# Patient Record
Sex: FEMALE | Race: WHITE | NOT HISPANIC OR LATINO | Employment: STUDENT | URBAN - METROPOLITAN AREA
[De-identification: names, ages, dates, MRNs, and addresses within clinical notes are randomized per-mention and may not be internally consistent; named-entity substitution may affect disease eponyms.]

---

## 2021-12-30 ENCOUNTER — TELEPHONE (OUTPATIENT)
Dept: URGENT CARE | Facility: CLINIC | Age: 21
End: 2021-12-30

## 2021-12-30 ENCOUNTER — TELEMEDICINE (OUTPATIENT)
Dept: FAMILY MEDICINE CLINIC | Facility: TELEHEALTH | Age: 21
End: 2021-12-30

## 2021-12-30 DIAGNOSIS — J02.9 EXUDATIVE PHARYNGITIS: Primary | ICD-10-CM

## 2021-12-30 PROCEDURE — 99213 OFFICE O/P EST LOW 20 MIN: CPT | Performed by: NURSE PRACTITIONER

## 2021-12-30 RX ORDER — PREDNISONE 10 MG/1
TABLET ORAL DAILY
Qty: 21 EACH | Refills: 0 | Status: SHIPPED | OUTPATIENT
Start: 2021-12-30 | End: 2022-01-05

## 2021-12-30 RX ORDER — NORETHINDRONE ACETATE AND ETHINYL ESTRADIOL, ETHINYL ESTRADIOL AND FERROUS FUMARATE 1MG-10(24)
1 KIT ORAL DAILY
COMMUNITY
Start: 2021-10-14

## 2021-12-30 RX ORDER — AMOXICILLIN AND CLAVULANATE POTASSIUM 875; 125 MG/1; MG/1
1 TABLET, FILM COATED ORAL 2 TIMES DAILY
Qty: 20 TABLET | Refills: 0 | Status: SHIPPED | OUTPATIENT
Start: 2021-12-30 | End: 2022-01-09

## 2021-12-30 NOTE — PROGRESS NOTES
Subjective   Chief Complaint   Patient presents with   • Sore Throat   • Earache   • Nasal Congestion       Nidhi Perez is a 21 y.o. female.     Pt reports bilateral earache, congestion, sore throat with white exudate x 4 days. She was seen 3 days ago for these symptoms, tested negative for strep throat but was prescribed Amoxil and Prednisone for 2 days. She states the prednisone helped some, but once she stopped it all her symptoms feel the same and sore throat is not improving. She states she has been Amoxil many times in the past for ear infections.     Sore Throat   This is a new problem. Episode onset: 4 days. The problem has been unchanged. There has been no fever. Associated symptoms include congestion, ear pain and swollen glands. Pertinent negatives include no abdominal pain, coughing, diarrhea, drooling, ear discharge, headaches, hoarse voice, plugged ear sensation, neck pain, shortness of breath, stridor, trouble swallowing or vomiting. Treatments tried: amoxil, prednisone.        No Known Allergies    History reviewed. No pertinent past medical history.    Past Surgical History:   Procedure Laterality Date   • KNEE ACL RECONSTRUCTION Left        Social History     Socioeconomic History   • Marital status: Single   Tobacco Use   • Smoking status: Never Smoker   • Smokeless tobacco: Never Used   Substance and Sexual Activity   • Alcohol use: Never   • Drug use: Defer   • Sexual activity: Defer       History reviewed. No pertinent family history.      Current Outpatient Medications:   •  amoxicillin-clavulanate (Augmentin) 875-125 MG per tablet, Take 1 tablet by mouth 2 (Two) Times a Day for 10 days., Disp: 20 tablet, Rfl: 0  •  Lo Loestrin Fe 1 MG-10 MCG / 10 MCG tablet, Take 1 tablet by mouth Daily., Disp: , Rfl:   •  predniSONE (DELTASONE) 10 MG (21) dose pack, Take  by mouth Daily for 6 days., Disp: 21 each, Rfl: 0      Review of Systems   Constitutional: Negative for chills, diaphoresis,  fatigue and fever.   HENT: Positive for congestion, ear pain, sore throat and swollen glands. Negative for drooling, ear discharge, hoarse voice and trouble swallowing.    Respiratory: Negative for cough, chest tightness, shortness of breath and stridor.    Gastrointestinal: Negative for abdominal pain, diarrhea and vomiting.   Musculoskeletal: Negative for myalgias and neck pain.        There were no vitals filed for this visit.    Objective   Physical Exam  Constitutional:       General: She is not in acute distress.     Appearance: Normal appearance. She is not ill-appearing, toxic-appearing or diaphoretic.   HENT:      Head: Normocephalic.      Nose: Congestion (per pt) present.      Mouth/Throat:      Lips: Pink.      Mouth: Mucous membranes are moist.      Pharynx: Posterior oropharyngeal erythema present.      Tonsils: Tonsillar exudate present.      Comments: Per pt, she states her throat feels tight and swollen, no difficulty swallowing or breathing.   Pulmonary:      Effort: Pulmonary effort is normal.   Lymphadenopathy:      Head:      Right side of head: Tonsillar adenopathy present.      Left side of head: Tonsillar adenopathy present.      Comments: Per pt     Neurological:      Mental Status: She is alert and oriented to person, place, and time.   Psychiatric:         Mood and Affect: Mood normal.         Behavior: Behavior normal.          Procedures     Assessment/Plan   Diagnoses and all orders for this visit:    1. Exudative pharyngitis (Primary)  -     amoxicillin-clavulanate (Augmentin) 875-125 MG per tablet; Take 1 tablet by mouth 2 (Two) Times a Day for 10 days.  Dispense: 20 tablet; Refill: 0  -     predniSONE (DELTASONE) 10 MG (21) dose pack; Take  by mouth Daily for 6 days.  Dispense: 21 each; Refill: 0    Stop Amoxil and begin Augmentin.   Warm salt water gargles throughout the day for comfort.  Take Tylenol for pain and/or fever, stay hydrated and rest.   If symptoms worsen or do not  improve follow up with your PCP or visit your nearest Urgent Care Center or ER.      Results for orders placed or performed during the hospital encounter of 12/27/21   POCT Rapid Strep A    Specimen: Swab   Result Value Ref Range    Rapid Strep A Screen Negative Negative, VALID, INVALID, Not Performed    Internal Control Passed Passed    Lot Number [id number]     Expiration Date 4/30/22        PLAN: Discussed dosing, side effects, recommended other symptomatic care.  Patient should follow up with primary care provider if symptoms worsen, fail to resolve or other symptoms need attention. Patient/family agree to the above.         RAVEN Diaz     This visit was performed via Telehealth.  This patient has been instructed to follow-up with their primary care provider if their symptoms worsen or the treatment provided does not resolve their illness.

## 2021-12-30 NOTE — PATIENT INSTRUCTIONS
Stop Amoxil and begin Augmentin.   Warm salt water gargles throughout the day for comfort.  Take Tylenol for pain and/or fever, stay hydrated and rest.   If symptoms worsen or do not improve follow up with your PCP or visit your nearest Urgent Care Center or ER.      Strep Throat, Adult  Strep throat is an infection of the throat. It is caused by germs (bacteria). Strep throat is common during the cold months of the year. It mostly affects children who are 5-15 years old. However, people of all ages can get it at any time of the year.  When strep throat affects the tonsils, it is called tonsillitis. When it affects the back of the throat, it is called pharyngitis. This infection spreads from person to person through coughing, sneezing, or having close contact.  What are the causes?  This condition is caused by the Streptococcus pyogenes germ.  What increases the risk?  You are more likely to develop this condition if:  · You care for young children. Children are more likely to get strep throat and may spread it to others.  · You go to crowded places. Germs can spread easily in such places.  · You kiss or touch someone who has strep throat.  What are the signs or symptoms?  Symptoms of this condition include:  · Fever or chills.  · Redness, swelling, or pain in the tonsils or throat.  · Pain or trouble when swallowing.  · White or yellow spots on the tonsils or throat.  · Tender glands in the neck and under the jaw.  · Bad breath.  · Red rash all over the body. This is rare.  How is this treated?  This condition may be treated with:  · Medicines that kill germs (antibiotics).  · Medicines that treat pain or fever. These include:  ? Ibuprofen or acetaminophen.  ? Aspirin, only for patients who are over the age of 18.  ? Throat lozenges.  ? Throat sprays.  Follow these instructions at home:  Medicines    · Take over-the-counter and prescription medicines only as told by your doctor.  · Take your antibiotic medicine as  told by your doctor. Do not stop taking the antibiotic even if you start to feel better.    Eating and drinking    · If you have trouble swallowing, eat soft foods until your throat feels better.  · Drink enough fluid to keep your pee (urine) pale yellow.  · To help with pain, you may have:  ? Warm fluids, such as soup and tea.  ? Cold fluids, such as frozen desserts or popsicles.    General instructions  · Rinse your mouth (gargle) with a salt-water mixture 3-4 times a day or as needed. To make a salt-water mixture, dissolve ½-1 tsp (3-6 g) of salt in 1 cup (237 mL) of warm water.  · Rest as much as you can.  · Stay home from work or school until you have been taking antibiotics for 24 hours.  · Avoid smoking or being around people who smoke.  · Keep all follow-up visits as told by your doctor. This is important.  How is this prevented?    · Do not share food, drinking cups, or personal items. They can cause the germs to spread.  · Wash your hands well with soap and water. Make sure that all people in your house wash their hands well.  · Have family members tested if they have a fever or a sore throat. They may need an antibiotic if they have strep throat.    Contact a doctor if:  · You have swelling in your neck that keeps getting bigger.  · You get a rash, cough, or earache.  · You cough up a thick fluid that is green, yellow-brown, or bloody.  · You have pain that does not get better with medicine.  · Your symptoms get worse instead of getting better.  · You have a fever.  Get help right away if:  · You vomit.  · You have a very bad headache.  · Your neck hurts or feels stiff.  · You have chest pain or are short of breath.  · You have drooling, very bad throat pain, or changes in your voice.  · Your neck is swollen, or the skin gets red and tender.  · Your mouth is dry, or you are peeing less than normal.  · You keep feeling more tired or have trouble waking up.  · Your joints are red or  painful.  Summary  · Strep throat is an infection of the throat. It is caused by germs (bacteria).  · This infection can spread from person to person through coughing, sneezing, or having close contact.  · Take your medicines, including antibiotics, as told by your doctor. Do not stop taking the antibiotic even if you start to feel better.  · To prevent the spread of germs, wash your hands well with soap and water. Have others do the same. Do not share food, drinking cups, or personal items.  · Get help right away if you have a bad headache, chest pain, shortness of breath, a stiff or painful neck, or you vomit.  This information is not intended to replace advice given to you by your health care provider. Make sure you discuss any questions you have with your health care provider.  Document Revised: 03/06/2020 Document Reviewed: 03/06/2020  [employer] Patient Education © 2021 [employer]    Tonsillitis    Tonsillitis is an infection of the throat that causes the tonsils to become red, tender, and swollen. Tonsils are tissues in the back of your throat. Each tonsil has crevices (crypts). Tonsils normally work to protect the body from infection.  What are the causes?  Sudden (acute) tonsillitis may be caused by a virus or bacteria, including streptococcal bacteria. Long-lasting (chronic) tonsillitis occurs when the crypts of the tonsils become filled with pieces of food and bacteria, which makes it easy for the tonsils to become repeatedly infected.  Tonsillitis can be spread from person to person (is contagious). It may be spread by inhaling droplets that are released with coughing or sneezing. You may also come into contact with viruses or bacteria on surfaces, such as cups or utensils.  What are the signs or symptoms?  Symptoms of this condition include:  · A sore throat. This may include trouble swallowing.  · White patches on the tonsils.  · Swollen tonsils.  · Fever.  · Headache.  · Tiredness.  · Loss of  appetite.  · Snoring during sleep when you did not snore before.  · Small, foul-smelling, yellowish-white pieces of material (tonsilloliths) that you occasionally cough up or spit out. These can cause you to have bad breath.  How is this diagnosed?  This condition is diagnosed with a physical exam. Diagnosis can be confirmed with the results of lab tests, including a throat culture.  How is this treated?  Treatment for this condition depends on the cause, but usually focuses on treating the symptoms associated with it. Treatment may include:  · Medicines to relieve pain and manage fever.  · Steroid medicines to reduce swelling.  · Antibiotic medicines if the condition is caused by bacteria.  If attacks of tonsillitis are severe and frequent, your health care provider may recommend surgery to remove the tonsils (tonsillectomy).  Follow these instructions at home:  Medicines  · Take over-the-counter and prescription medicines only as told by your health care provider.  · If you were prescribed an antibiotic medicine, take it as told by your health care provider. Do not stop taking the antibiotic even if you start to feel better.  Eating and drinking  · Drink enough fluid to keep your urine clear or pale yellow.  · While your throat is sore, eat soft or liquid foods, such as sherbet, soups, or instant breakfast drinks.  · Drink warm liquids.  · Eat frozen ice pops.  General instructions  · Rest as much as possible and get plenty of sleep.  · Gargle with a salt-water mixture 3-4 times a day or as needed. To make a salt-water mixture, completely dissolve ½-1 tsp of salt in 1 cup of warm water.  · Wash your hands regularly with soap and water. If soap and water are not available, use hand .  · Do not share cups, bottles, or other utensils until your symptoms have gone away.  · Do not smoke. This can help your symptoms and prevent the infection from coming back. If you need help quitting, ask your health care  provider.  · Keep all follow-up visits as told by your health care provider. This is important.  Contact a health care provider if:  · You notice large, tender lumps in your neck that were not there before.  · You have a fever that does not go away after 2-3 days.  · You develop a rash.  · You cough up a green, yellow-brown, or bloody substance.  · You cannot swallow liquids or food for 24 hours.  · Only one of your tonsils is swollen.  Get help right away if:  · You develop any new symptoms, such as vomiting, severe headache, stiff neck, chest pain, trouble breathing, or trouble swallowing.  · You have severe throat pain along with drooling or voice changes.  · You have severe pain that is not controlled with medicines.  · You cannot fully open your mouth.  · You develop redness, swelling, or severe pain anywhere in your neck.  Summary  · Tonsillitis is an infection of the throat that causes the tonsils to become red, tender, and swollen.  · Tonsillitis may be caused by a virus or bacteria.  · Rest as much as possible. Get plenty of sleep.  This information is not intended to replace advice given to you by your health care provider. Make sure you discuss any questions you have with your health care provider.  Document Revised: 11/30/2018 Document Reviewed: 01/23/2018  [employer] Patient Education © 2021 [employer]

## 2023-08-03 ENCOUNTER — E-VISIT (OUTPATIENT)
Dept: FAMILY MEDICINE CLINIC | Facility: TELEHEALTH | Age: 23
End: 2023-08-03

## 2023-08-03 PROCEDURE — BRIGHTMDVISIT: Performed by: NURSE PRACTITIONER

## 2024-07-24 ENCOUNTER — OFFICE VISIT (OUTPATIENT)
Age: 24
End: 2024-07-24
Payer: COMMERCIAL

## 2024-07-24 VITALS
OXYGEN SATURATION: 100 % | BODY MASS INDEX: 20.33 KG/M2 | HEART RATE: 53 BPM | HEIGHT: 70 IN | DIASTOLIC BLOOD PRESSURE: 80 MMHG | SYSTOLIC BLOOD PRESSURE: 118 MMHG | WEIGHT: 142 LBS

## 2024-07-24 DIAGNOSIS — Z53.21 PATIENT LEFT WITHOUT BEING SEEN: Primary | ICD-10-CM

## 2024-07-24 DIAGNOSIS — Z00.00 HEALTHCARE MAINTENANCE: Primary | ICD-10-CM

## 2024-07-24 PROCEDURE — 99385 PREV VISIT NEW AGE 18-39: CPT | Performed by: STUDENT IN AN ORGANIZED HEALTH CARE EDUCATION/TRAINING PROGRAM

## 2024-07-24 RX ORDER — LEVONORGESTREL 52 MG/1
1 INTRAUTERINE DEVICE INTRAUTERINE ONCE
COMMUNITY

## 2024-07-24 NOTE — PROGRESS NOTES
Chief Complaint  Left Without Being Seen    Subjective          Nidhi Perez presents to [facility] for   History of Present Illness    Objective   Vital Signs:   Vitals:     There is no height or weight on file to calculate BMI.              Physical Exam   Result Review :                Immunization History   Administered Date(s) Administered    COVID-19 (PFIZER) Purple Cap Monovalent 05/18/2021, 06/09/2021, 01/02/2022    Fluzone (or Fluarix & Flulaval for VFC) >6mos 10/09/2021    Influenza Injectable Mdck Pf Quad 01/02/2023       Health Maintenance   Topic Date Due    HPV VACCINES (1 - 3-dose series) Never done    TDAP/TD VACCINES (1 - Tdap) Never done    HEPATITIS C SCREENING  Never done    ANNUAL PHYSICAL  Never done    PAP SMEAR  Never done    COVID-19 Vaccine (4 - 2023-24 season) 10/13/2024 (Originally 9/1/2023)    INFLUENZA VACCINE  08/01/2024    Pneumococcal Vaccine 0-64  Aged Out            Assessment and Plan    Diagnoses and all orders for this visit:    1. Patient left without being seen (Primary)        Counseling/anticipatory guidance: Nutrition, physical activity, healthy weight, dental health, mental health, eye exam, immunizations, screenings      Follow Up   No follow-ups on file.  Patient was given instructions and counseling regarding her condition or for health maintenance advice. Please see specific information pulled into the AVS if appropriate.

## 2024-07-24 NOTE — LETTER
[address] [address]  [phone]       [facility]  IMMUNIZATION CERTIFICATE    (Required for each child enrolled in day care center, certified family  home, other licensed facility which cares for children,  programs, and public and private primary and secondary schools.)    Name of Child:  Nidhi Perez  YOB: 2000   Name of Parent:  ______________________________  Address:  [address] [place] [id number]     VACCINE/DOSE DATE DATE DATE DATE DATE   Hepatitis B 2000 2001 2001     Alt. Adult Hepatitis B¹        DTap/DTP/DT² 2001 2001 2001 7/12/2002 1/27/2005   Hib³ 2001 2001 2001 4/19/2002    Pneumococcal  2001 2001 2001 1/14/2002    Polio 2001 2001 7/12/2002 1/27/2005    Influenza 10/9/2021       MMR 4/19/2002 1/27/2005      Varicella 1/14/2002 8/10/2011      Hepatitis A 6/27/2017 3/7/2018      Meningococcal 1/30/2012 6/27/2017      Td        Tdap 1/30/2012 7/30/2021      Rotavirus        HPV 6/24/2016 10/12/2016 4/7/2017     Men B 6/27/2017 3/7/2018      Pneumococcal (PPSV23)          ¹ Alternative two dose series of approved adult hepatitis B vaccine for adolescents 11 through 15 years of age. ² DTaP, DTP, or DT. ³ Hib not required at 5 years of age or more.    Had Chickenpox or Zoster disease: No     This child is current for immunizations until   /  /  , (14 days after the next shot is due) after which this certificate is no longer valid, and a new certificate must be obtained.   This child is not up-to-date at this time.  This certificate is valid until   /  /  ,l  (14 days after the next shot is due) after which this certificate is no longer valid, and a new certificate must be obtained.    Reason child is not up-to-date:   Provisional Status - Child is behind on required immunizations.   Medical Exemption - The following immunizations are not medically  indicated:  ___________________                                      _______________________________________________________________________________       If Medical Exemption, can these vaccines be administered at a later date?  No:  _  Yes: _  Date: __/__/__    Congregational Objection  I CERTIFY THAT THE ABOVE NAMED CHILD HAS RECEIVED IMMUNIZATIONS AS STIPULATED ABOVE.     __________________________________________________________     Date: 7/24/2024   (Signature of physician, APRN, PA, pharmacist, LHD , RN or LPN designee)      This Certificate should be presented to the school or facility in which the child intends to enroll and should be retained by the school or facility and filed with the child's health record.

## 2024-07-24 NOTE — PROGRESS NOTES
Chief Complaint  Left Without Being Seen    Subjective     {CC  Problem List  Visit Diagnosis   Encounters  Notes  Medications  Labs  Result Review Imaging  Media :23}     Nidhi Perez presents to [facility] for   History of Present Illness  Objective   Vital Signs:   Vitals:     There is no height or weight on file to calculate BMI.  ?  BMI is within normal parameters. No other follow-up for BMI required.    Physical Exam   Result Review :{ Labs  Result Review  Imaging  Med Tab  Media :23}   {The following data was reviewed by (Optional):20781}  {Ambulatory Labs (Optional):28578}  {Data reviewed (Optional):02248:::1}         Immunization History   Administered Date(s) Administered   • COVID-19 (PFIZER) Purple Cap Monovalent 05/18/2021, 06/09/2021, 01/02/2022   • Fluzone (or Fluarix & Flulaval for VFC) >6mos 10/09/2021   • Influenza Injectable Mdck Pf Quad 01/02/2023     Health Maintenance   Topic Date Due   • HPV VACCINES (1 - 3-dose series) Never done   • TDAP/TD VACCINES (1 - Tdap) Never done   • HEPATITIS C SCREENING  Never done   • ANNUAL PHYSICAL  Never done   • PAP SMEAR  Never done   • COVID-19 Vaccine (4 - 2023-24 season) 10/13/2024 (Originally 9/1/2023)   • INFLUENZA VACCINE  08/01/2024   • Pneumococcal Vaccine 0-64  Aged Out        Assessment and Plan {CC Problem List  Visit Diagnosis  ROS  Review (Popup)  Health Maintenance  Quality  BestPractice  Medications  SmartSets  SnapShot Encounters  Media :23}   Diagnoses and all orders for this visit:    1. Patient left without being seen (Primary)        Counseling/anticipatory guidance: Nutrition, physical activity, healthy weight, dental health, mental health, eye exam, immunizations, screenings    {Time Spent (Optional):59520}  Follow Up {Instructions Charge Capture  Follow-up Communications :23}  No follow-ups on file.  Patient was given instructions and counseling regarding her condition  or for health maintenance advice. Please see specific information pulled into the AVS if appropriate.        The patient left the office {Time of Visit:99203} care was provided and did not complete the visit {LWBS Reason:00295}.

## 2024-07-25 NOTE — PROGRESS NOTES
Office Note     Name: Nidhi Perez    : 2000     MRN: [id number]     Chief Complaint  New Patient    Subjective     History of Present Illness:  Nidhi Perez is a 24 y.o. female who presents today for initial visit to establish care and for health maintenance exam with sports physical.  She is playing basketball in college and has no new symptomatic concerns. History of acl surgery.     Past Medical History: History reviewed. No pertinent past medical history.    Past Surgical History:   Past Surgical History:   Procedure Laterality Date    KNEE ACL RECONSTRUCTION Left        Immunizations:   Immunization History   Administered Date(s) Administered    COVID-19 (PFIZER) Purple Cap Monovalent 2021, 2021, 2022    DTaP 2001, 2001, 2001, 2002, 2005    Fluzone (or Fluarix & Flulaval for VFC) >6mos 10/09/2021    Hepatitis A 2017, 2018    Hepatitis B Adult/Adolescent IM 2001, 2001, 2001    HiB 2001, 2001, 2001, 2002    Hpv9 2016, 10/12/2016, 2017    IPV 2001, 2001, 2002, 2005    Influenza Injectable Mdck Pf Quad 2023    MMR 2002, 2005    Meningococcal B,(Bexsero) 2017, 2018    Meningococcal Conjugate 2012, 2017    Pneumococcal, Unspecified 2001, 2001, 2001, 2002    Tdap 2012, 2021    Varicella 2002, 08/10/2011        Medications:     Current Outpatient Medications:     Levonorgestrel (Mirena, 52 MG,) 20 MCG/DAY intrauterine device IUD, To be inserted one time by prescriber. Route intrauterine., Disp: , Rfl:     Allergies:   No Known Allergies    Family History: History reviewed. No pertinent family history.    Social History:   Social History     Socioeconomic History    Marital status: Single   Tobacco Use    Smoking status: Never    Smokeless tobacco: Never   Vaping Use     "Vaping status: Never Used   Substance and Sexual Activity    Alcohol use: Never    Drug use: Never    Sexual activity: Not Currently     Partners: Male     Birth control/protection: I.U.D.         Objective     Vital Signs  /80 (BP Location: Right arm, Patient Position: Sitting, Cuff Size: Adult)   Pulse 53   Ht 177.5 cm (69.9\")   Wt 64.4 kg (142 lb)   SpO2 100%   BMI 20.43 kg/m²   Estimated body mass index is 20.43 kg/m² as calculated from the following:    Height as of this encounter: 177.5 cm (69.9\").    Weight as of this encounter: 64.4 kg (142 lb).    BMI is within normal parameters. No other follow-up for BMI required.      Physical Exam  Constitutional:       General: She is not in acute distress.     Appearance: She is not toxic-appearing.   HENT:      Mouth/Throat:      Mouth: Mucous membranes are moist.      Pharynx: No oropharyngeal exudate or posterior oropharyngeal erythema.   Eyes:      Extraocular Movements: Extraocular movements intact.      Pupils: Pupils are equal, round, and reactive to light.   Cardiovascular:      Rate and Rhythm: Normal rate and regular rhythm.      Heart sounds: No murmur heard.     No friction rub. No gallop.   Pulmonary:      Effort: Pulmonary effort is normal.      Breath sounds: Normal breath sounds.   Abdominal:      General: Abdomen is flat. There is no distension.   Musculoskeletal:         General: No swelling, tenderness, deformity or signs of injury. Normal range of motion.      Comments: Complete shoulder, elbow, knee, ankle exams normal aside from prior acl repair and crepitus of knees   Skin:     General: Skin is warm and dry.   Neurological:      Mental Status: She is alert.      Motor: No weakness.      Coordination: Coordination normal.      Gait: Gait normal.   Psychiatric:         Mood and Affect: Mood normal.         Behavior: Behavior normal.          Assessment and Plan     1. Healthcare maintenance  No indication for early screenings.  Sports " physical completed and form scanned in.  OK to continue with sports with no restrictions  UTD on all vaccines       Counseling was given to patient for the following topics: instructions for management.    Follow Up  No follow-ups on file.    Kofi Fonseca MD  [facility]  [address] [address]  [phone]

## 2025-01-13 ENCOUNTER — E-VISIT (OUTPATIENT)
Dept: FAMILY MEDICINE CLINIC | Facility: TELEHEALTH | Age: 25
End: 2025-01-13
Payer: COMMERCIAL

## 2025-01-13 NOTE — E-VISIT ESCALATED
Date: 2025 00:18:26  Clinician: Nahomy Buitrago  Clinician NPI: [id number]  Patient: Nidhi Emery  Patient : 2000  Patient Address: [address] [place] [address]  Patient Phone: [phone]  Visit Protocol: Eye conditions  Patient Summary:  Nidhi is a 25 year old (: 2000 ) female who initiated a visit for stye.      Images of the eye(s) were uploaded.   The symptoms started 1-2 days ago and affect the right eye. The symptoms consist of itchy eye(s), eye pain,   eyelid swelling, and bump(s) on the eyelid. Additionally, vision has become more blurry since symptoms started, but it's possible the blurry vision is caused by the eyelid swelling and/or drainage coming from the eye(s).   Symptom details     Itchiness: Nidhi does not have seasonal allergies or hay fever.    Eyelid bump(s): Nidhi has 1 bump on the lower eyelid. The bump(s) on the eyelid is tender.    Eye pain: Nidhi is experiencing moderate eye pain Nidhi has not taken over-the-counter medication for the pain.    Decrease in vision: Nidhi reports blurry vision or a decrease in vision since symptoms have started.     Denied symptoms include headache, nausea, drainage coming from the eye(s), eye redness, and light sensitivity. Nidhi does not have subconjunctival hemorrhage. Nidhi does not feel feverish.   Precipitating events   Nidhi has not had a recent foreign   body in the eye(s), eye injury, cold or ear infection, and eye surgery. Nidhi does not wear contact lenses.   Pertinent medical history  Nidhi has never been diagnosed with glaucoma.   Nidhi is not taking medication to treat current symptoms.   Nidhi   does not have diabetes.   Nidhi does not smoke or use smokeless tobacco. Nidhi does not vape or use other e-cigarette products.   Nidhi denies pregnancy and denies breastfeeding.     MEDICATIONS: Mirena intrauterine, ALLERGIES: NKDA  Clinician Response:  Dear Nidhi,  I am sorry you are not feeling well. Your health is  our priority. To determine the most appropriate care for you, I would like you to be seen in person to further discuss your health history and symptoms.  You will not   be charged for this visit. Thank you for trusting us with your care.   Diagnosis: Refer for additional evaluation  Diagnosis ICD: R69  Additional Clinician Notes:  please sign in for a video visit to discuss your symptoms. thanks

## 2025-01-14 ENCOUNTER — PATIENT ROUNDING (BHMG ONLY) (OUTPATIENT)
Dept: URGENT CARE | Facility: CLINIC | Age: 25
End: 2025-01-14
Payer: COMMERCIAL

## 2025-01-14 NOTE — ED NOTES
Thank you for letting us care for you in your recent visit to our urgent care center. We would love to hear about your experience with us. Was this the first time you have visited our location?    We’re always looking for ways to make our patients’ experiences even better. Do you have any recommendations on ways we may improve?     I appreciate you taking the time to respond. Please be on the lookout for a survey about your recent visit from [facility] via text or email. We would greatly appreciate if you could fill that out and turn it back in. We want your voice to be heard and we value your feedback.   Thank you for choosing [facility] for your healthcare needs.